# Patient Record
(demographics unavailable — no encounter records)

---

## 2025-07-08 NOTE — ASSESSMENT
[FreeTextEntry1] : Hiccups  I explained to the wife that is most likely related to the viral illness.  Probably could be related to irritation either of the diaphragm or the phrenic nerve.  The episode resolves with the treating underlying condition that at this point the patient is stable.  The chest x-ray was done at another facility and the report was normal.  6-minute walk test was normal with no evidence of desaturation.  The PFT revealed restrictive lung disease but the diffusion capacity increased from 67% to 91%.  At this time patient is to be up-to-date with immunization and will follow-up in 1 year

## 2025-07-08 NOTE — HISTORY OF PRESENT ILLNESS
[Never] : never [TextBox_4] : The patient had a viral illness and the hiccups came back again he went to urgent care at the start of an antibiotic due to an x-ray and was told it was normal.  Now he is better he is exercising no shortness of breath no cough